# Patient Record
Sex: FEMALE | Race: WHITE
[De-identification: names, ages, dates, MRNs, and addresses within clinical notes are randomized per-mention and may not be internally consistent; named-entity substitution may affect disease eponyms.]

---

## 2018-12-26 ENCOUNTER — HOSPITAL ENCOUNTER (EMERGENCY)
Dept: HOSPITAL 82 - ED | Age: 48
Discharge: HOME | DRG: 392 | End: 2018-12-26
Payer: COMMERCIAL

## 2018-12-26 VITALS — BODY MASS INDEX: 33.94 KG/M2 | HEIGHT: 65 IN | WEIGHT: 203.71 LBS

## 2018-12-26 VITALS — SYSTOLIC BLOOD PRESSURE: 119 MMHG | DIASTOLIC BLOOD PRESSURE: 73 MMHG

## 2018-12-26 DIAGNOSIS — K29.00: Primary | ICD-10-CM

## 2018-12-26 DIAGNOSIS — Z87.442: ICD-10-CM

## 2018-12-26 DIAGNOSIS — E11.9: ICD-10-CM

## 2018-12-26 LAB
ALBUMIN SERPL-MCNC: 4 G/DL (ref 3.2–5)
ALP SERPL-CCNC: 74 U/L (ref 38–126)
AMYLASE SERPL-CCNC: 54 U/L (ref 30–110)
ANION GAP SERPL CALCULATED.3IONS-SCNC: 16 MMOL/L
AST SERPL-CCNC: 26 U/L (ref 14–36)
BASOPHILS NFR BLD AUTO: 0 % (ref 0–3)
BUN SERPL-MCNC: 10 MG/DL (ref 7–17)
BUN/CREAT SERPL: 24
CHLORIDE SERPL-SCNC: 97 MMOL/L (ref 95–108)
CO2 SERPL-SCNC: 28 MMOL/L (ref 22–30)
CREAT SERPL-MCNC: 0.4 MG/DL (ref 0.5–1)
EOSINOPHIL NFR BLD AUTO: 0 % (ref 0–8)
ERYTHROCYTE [DISTWIDTH] IN BLOOD BY AUTOMATED COUNT: 15.1 % (ref 11.5–15.5)
HCT VFR BLD AUTO: 36.7 % (ref 37–47)
HGB BLD-MCNC: 12.1 G/DL (ref 12–16)
IMM GRANULOCYTES NFR BLD: 0.4 % (ref 0–5)
LIPASE SERPL-CCNC: 35 U/L (ref 23–300)
LYMPHOCYTES NFR BLD: 14 % (ref 15–41)
MCH RBC QN AUTO: 29.1 PG  CALC (ref 26–32)
MCHC RBC AUTO-ENTMCNC: 33 G/L CALC (ref 32–36)
MCV RBC AUTO: 88.2 FL  CALC (ref 80–100)
MONOCYTES NFR BLD AUTO: 6 % (ref 2–13)
NEUTROPHILS # BLD AUTO: 12.12 THOU/UL (ref 2–7.15)
NEUTROPHILS NFR BLD AUTO: 79 % (ref 42–76)
PLATELET # BLD AUTO: 316 THOU/UL (ref 130–400)
POTASSIUM SERPL-SCNC: 3.6 MMOL/L (ref 3.5–5.1)
PROT SERPL-MCNC: 7.5 G/DL (ref 6.3–8.2)
RBC # BLD AUTO: 4.16 MILL/UL (ref 4.2–5.6)
SODIUM SERPL-SCNC: 137 MMOL/L (ref 137–146)

## 2019-03-19 ENCOUNTER — HOSPITAL ENCOUNTER (OUTPATIENT)
Dept: HOSPITAL 82 - PO | Age: 49
Discharge: HOME | DRG: 951 | End: 2019-03-19
Attending: SURGERY
Payer: COMMERCIAL

## 2019-03-19 VITALS — DIASTOLIC BLOOD PRESSURE: 71 MMHG | SYSTOLIC BLOOD PRESSURE: 95 MMHG

## 2019-03-19 DIAGNOSIS — Z01.818: Primary | ICD-10-CM

## 2019-03-19 DIAGNOSIS — N13.30: ICD-10-CM

## 2019-03-19 DIAGNOSIS — E11.9: ICD-10-CM

## 2019-03-19 DIAGNOSIS — R00.0: ICD-10-CM

## 2019-03-19 DIAGNOSIS — R11.2: ICD-10-CM

## 2019-03-19 DIAGNOSIS — K59.00: ICD-10-CM

## 2019-03-19 DIAGNOSIS — K21.9: ICD-10-CM

## 2019-03-19 DIAGNOSIS — R14.0: ICD-10-CM

## 2019-03-19 DIAGNOSIS — Z98.51: ICD-10-CM

## 2019-03-19 DIAGNOSIS — Z98.890: ICD-10-CM

## 2019-03-19 DIAGNOSIS — Z90.49: ICD-10-CM

## 2019-03-19 DIAGNOSIS — I10: ICD-10-CM

## 2019-03-27 ENCOUNTER — HOSPITAL ENCOUNTER (OUTPATIENT)
Dept: HOSPITAL 82 - ENDO | Age: 49
Discharge: HOME | DRG: 392 | End: 2019-03-27
Attending: SURGERY
Payer: COMMERCIAL

## 2019-03-27 VITALS — BODY MASS INDEX: 31.49 KG/M2 | HEIGHT: 65 IN | WEIGHT: 189 LBS

## 2019-03-27 VITALS — SYSTOLIC BLOOD PRESSURE: 121 MMHG | DIASTOLIC BLOOD PRESSURE: 91 MMHG

## 2019-03-27 DIAGNOSIS — K62.1: ICD-10-CM

## 2019-03-27 DIAGNOSIS — K29.70: Primary | ICD-10-CM

## 2019-03-27 DIAGNOSIS — K50.10: ICD-10-CM

## 2019-03-27 PROCEDURE — 0DBK8ZX EXCISION OF ASCENDING COLON, VIA NATURAL OR ARTIFICIAL OPENING ENDOSCOPIC, DIAGNOSTIC: ICD-10-PCS | Performed by: SURGERY

## 2019-03-27 PROCEDURE — 0DB78ZX EXCISION OF STOMACH, PYLORUS, VIA NATURAL OR ARTIFICIAL OPENING ENDOSCOPIC, DIAGNOSTIC: ICD-10-PCS | Performed by: SURGERY

## 2019-03-27 PROCEDURE — 0DBH8ZX EXCISION OF CECUM, VIA NATURAL OR ARTIFICIAL OPENING ENDOSCOPIC, DIAGNOSTIC: ICD-10-PCS | Performed by: SURGERY

## 2019-03-27 PROCEDURE — 0DBP8ZX EXCISION OF RECTUM, VIA NATURAL OR ARTIFICIAL OPENING ENDOSCOPIC, DIAGNOSTIC: ICD-10-PCS | Performed by: SURGERY

## 2019-04-07 ENCOUNTER — HOSPITAL ENCOUNTER (OUTPATIENT)
Dept: HOSPITAL 82 - ED | Age: 49
Setting detail: OBSERVATION
LOS: 2 days | Discharge: HOME | DRG: 392 | End: 2019-04-09
Attending: INTERNAL MEDICINE | Admitting: INTERNAL MEDICINE
Payer: COMMERCIAL

## 2019-04-07 VITALS — HEIGHT: 65 IN | BODY MASS INDEX: 31.04 KG/M2 | WEIGHT: 186.31 LBS

## 2019-04-07 DIAGNOSIS — Z90.49: ICD-10-CM

## 2019-04-07 DIAGNOSIS — M19.90: ICD-10-CM

## 2019-04-07 DIAGNOSIS — N13.2: ICD-10-CM

## 2019-04-07 DIAGNOSIS — K29.00: Primary | ICD-10-CM

## 2019-04-07 DIAGNOSIS — E11.9: ICD-10-CM

## 2019-04-07 DIAGNOSIS — I10: ICD-10-CM

## 2019-04-07 DIAGNOSIS — K29.50: ICD-10-CM

## 2019-04-07 DIAGNOSIS — Z79.899: ICD-10-CM

## 2019-04-07 LAB
ALBUMIN SERPL-MCNC: 4.4 G/DL (ref 3.2–5)
ALP SERPL-CCNC: 82 U/L (ref 38–126)
AMYLASE SERPL-CCNC: 49 U/L (ref 30–110)
ANION GAP SERPL CALCULATED.3IONS-SCNC: 18 MMOL/L
AST SERPL-CCNC: 20 U/L (ref 14–36)
BASOPHILS NFR BLD AUTO: 0 % (ref 0–3)
BILIRUB UR QL STRIP.AUTO: NEGATIVE
BUN SERPL-MCNC: 11 MG/DL (ref 7–17)
BUN/CREAT SERPL: 21
CHLORIDE SERPL-SCNC: 98 MMOL/L (ref 95–108)
CO2 SERPL-SCNC: 27 MMOL/L (ref 22–30)
COLOR UR AUTO: YELLOW
CREAT SERPL-MCNC: 0.5 MG/DL (ref 0.5–1)
EOSINOPHIL NFR BLD AUTO: 3 % (ref 0–8)
ERYTHROCYTE [DISTWIDTH] IN BLOOD BY AUTOMATED COUNT: 14.5 % (ref 11.5–15.5)
GLUCOSE UR STRIP.AUTO-MCNC: NEGATIVE MG/DL
HCT VFR BLD AUTO: 38.4 % (ref 37–47)
HGB BLD-MCNC: 12.7 G/DL (ref 12–16)
HGB UR QL STRIP.AUTO: (no result)
IMM GRANULOCYTES NFR BLD: 0.3 % (ref 0–5)
KETONES UR STRIP.AUTO-MCNC: NEGATIVE MG/DL
LEUKOCYTE ESTERASE UR QL STRIP.AUTO: NEGATIVE
LIPASE SERPL-CCNC: 63 U/L (ref 23–300)
LYMPHOCYTES NFR BLD: 19 % (ref 15–41)
MCH RBC QN AUTO: 29.5 PG  CALC (ref 26–32)
MCHC RBC AUTO-ENTMCNC: 33.1 G/L CALC (ref 32–36)
MCV RBC AUTO: 89.3 FL  CALC (ref 80–100)
MONOCYTES NFR BLD AUTO: 6 % (ref 2–13)
NEUTROPHILS # BLD AUTO: 8.5 THOU/UL (ref 2–7.15)
NEUTROPHILS NFR BLD AUTO: 71 % (ref 42–76)
NITRITE UR QL STRIP.AUTO: NEGATIVE
PH UR STRIP.AUTO: 5.5 [PH] (ref 4.5–8)
PLATELET # BLD AUTO: 362 THOU/UL (ref 130–400)
POTASSIUM SERPL-SCNC: 3.7 MMOL/L (ref 3.5–5.1)
PROT SERPL-MCNC: 7.5 G/DL (ref 6.3–8.2)
PROT UR QL STRIP.AUTO: NEGATIVE MG/DL
RBC # BLD AUTO: 4.3 MILL/UL (ref 4.2–5.6)
SODIUM SERPL-SCNC: 139 MMOL/L (ref 137–146)
SP GR UR STRIP.AUTO: 1.02
SQUAMOUS URNS QL MICRO: (no result) EPI/HPF
UROBILINOGEN UR QL STRIP.AUTO: 0.2 E.U./DL

## 2019-04-07 PROCEDURE — G0378 HOSPITAL OBSERVATION PER HR: HCPCS

## 2019-04-07 PROCEDURE — S0164 INJECTION PANTROPRAZOLE: HCPCS

## 2019-04-08 VITALS — DIASTOLIC BLOOD PRESSURE: 79 MMHG | SYSTOLIC BLOOD PRESSURE: 110 MMHG

## 2019-04-08 VITALS — DIASTOLIC BLOOD PRESSURE: 62 MMHG | SYSTOLIC BLOOD PRESSURE: 94 MMHG

## 2019-04-08 VITALS — SYSTOLIC BLOOD PRESSURE: 86 MMHG | DIASTOLIC BLOOD PRESSURE: 59 MMHG

## 2019-04-08 VITALS — SYSTOLIC BLOOD PRESSURE: 94 MMHG | DIASTOLIC BLOOD PRESSURE: 75 MMHG

## 2019-04-08 VITALS — DIASTOLIC BLOOD PRESSURE: 80 MMHG | SYSTOLIC BLOOD PRESSURE: 120 MMHG

## 2019-04-08 VITALS — SYSTOLIC BLOOD PRESSURE: 90 MMHG | DIASTOLIC BLOOD PRESSURE: 60 MMHG

## 2019-04-08 VITALS — SYSTOLIC BLOOD PRESSURE: 120 MMHG | DIASTOLIC BLOOD PRESSURE: 79 MMHG

## 2019-04-08 LAB
BASOPHILS NFR BLD AUTO: 0 % (ref 0–3)
EOSINOPHIL NFR BLD AUTO: 2 % (ref 0–8)
ERYTHROCYTE [DISTWIDTH] IN BLOOD BY AUTOMATED COUNT: 14.4 % (ref 11.5–15.5)
HCT VFR BLD AUTO: 34.6 % (ref 37–47)
HGB BLD-MCNC: 11.1 G/DL (ref 12–16)
IMM GRANULOCYTES NFR BLD: 0.5 % (ref 0–5)
LYMPHOCYTES NFR BLD: 21 % (ref 15–41)
MCH RBC QN AUTO: 29 PG  CALC (ref 26–32)
MCHC RBC AUTO-ENTMCNC: 32.1 G/L CALC (ref 32–36)
MCV RBC AUTO: 90.3 FL  CALC (ref 80–100)
MONOCYTES NFR BLD AUTO: 6 % (ref 2–13)
NEUTROPHILS # BLD AUTO: 7.65 THOU/UL (ref 2–7.15)
NEUTROPHILS NFR BLD AUTO: 70 % (ref 42–76)
PLATELET # BLD AUTO: 279 THOU/UL (ref 130–400)
RBC # BLD AUTO: 3.83 MILL/UL (ref 4.2–5.6)

## 2019-04-08 NOTE — NUR
NOTIFIED ER DOCTOR THAT PT. IS C/O SORE THROAT; ORDER RECEIVED TO OBTAIN STREP
SWAB; WILL CARRY OUT ORDER;

## 2019-04-08 NOTE — NUR
TRANSPORTED OFF UNIT VIA W/C BY VOLUNTEER FOR PROCEDURE AND RETURNED TO UNIT,
RESTING IN BED AT THIS TIME, ALL NEEDS ADDRESSED, FAMILY IN ROOM.

## 2019-04-08 NOTE — NUR
SHIFT CHANGE REPORT, PT SLEEPING BUT AROUSED TO VERVAL STIMULI, ORIENTED, C/O
PAIN @ 2/10 TO RIGHT SIDE, IVF INFUSING, CALL BELL IN REACH.

## 2019-04-08 NOTE — NUR
SECOND BOLUS STARTED AS PER ORDER; ORDERED TORADOL GIVEN FOR RIGHT SIDED ABD
PAIN 4/10; WILL REASSESS; CALL LIGHT IS IN REACH.

## 2019-04-08 NOTE — NUR
NOTIFIED DR. GONSALVES OF B/P NOW BEING 94/62 AND OF BEING TACHYCARDIC 
AND LOW GRADE TEMP NOW OF 99.1, BUT WITH IT PREVIOUSLY BEING 100; ALSO THAT
PT. IS C/O ABD PAIN; NEW ORDERS RECEIVED AND TO BE CARRIED OUT; WILL UPDATE
PT. WITH POC.

## 2019-04-08 NOTE — NUR
PT RESTING IN BED WATCHING TV. PT C/O PAIN 4/10 MEDICATED PER ORDER. PT IN A&O
x3. AMBULATES SELF TO RESTROOM. ASSESMENT COMPLETED AT THIS TIME. IV INFUSING
WELL. NO OTHER NEEDS AT THIS TIME. CALL BELL IN REACH. WILL CONTINUE TO
MONITOR.

## 2019-04-08 NOTE — NUR
0430-CALLED ED DR AND NOTIFIED HIM OF PT'S B/P BEING 86/59; NEW ORDERS
RECEIVED AND TO BE CARRIED OUT.
 
0435- NS 1,000 ML BOLUS HUNG AT THIS TIME FOR B/P 86/59; WILL REASSESS POST
BOLUS. PT. IS ASYMPTOMATIC.

## 2019-04-08 NOTE — NUR
PT. ARRIVED TO THE FLOOR VIA STRETCHER ACCOMPANIED BY ER NURSE @0109; PT.
AMBULATORY AND STEADY GAIT; PT. VOIDED 400MLS OF CLEAR YELLOW URINE AND
STRAINED AT THIS TIME; NO STONES NOTED; IV SITE PATENT AND ORDERED IVF HUNG;
PT. REPORTS PAIN DOWN TO 4/10; EDUCATED ON PAIN MEDICATION FREQUENCIES AND
VERBALIZES UNDERSTANDING; TEMP REASSESSED AND IS 99.8; WILL CONTINUE TO
MONITOR; EDUCATED ON POC, CALL LIGHT USE, AND ROOM; VERBALIZES UNDERSTANDING;
PT. DOES C/O SORE THROAT; NO REDNESS NOTED TO THROAT; INSTRUCTED PT. TO CALL
FOR ANY NEEDS; CALL LIGHT IS IN REACH; WILL CONTINUE TO MONITOR.

## 2019-04-08 NOTE — NUR
Admission Note
 
 
Report Given to:         NIHARIKA MICHELLE
Transported by:           Wheelchair          X Stretcher
 
Transported with:        X Nurse     Transporter   X Patent IV    O2
                          Cardiac Monitor

## 2019-04-08 NOTE — NUR
RECEIVED REPORT FROM DAY NURSE. PT RESTING IN BED WATCHING TV. PT C/O PAIN AT
THIS TIME. CALL BELL IN REACH. WILL CONTINUE TO MONITOR.

## 2019-04-09 VITALS — SYSTOLIC BLOOD PRESSURE: 108 MMHG | DIASTOLIC BLOOD PRESSURE: 72 MMHG

## 2019-04-09 VITALS — DIASTOLIC BLOOD PRESSURE: 80 MMHG | SYSTOLIC BLOOD PRESSURE: 112 MMHG

## 2019-04-09 VITALS — SYSTOLIC BLOOD PRESSURE: 104 MMHG | DIASTOLIC BLOOD PRESSURE: 73 MMHG

## 2019-04-09 LAB
ALBUMIN SERPL-MCNC: 3 G/DL (ref 3.2–5)
ALP SERPL-CCNC: 63 U/L (ref 38–126)
AMYLASE SERPL-CCNC: < 30 U/L (ref 30–110)
ANION GAP SERPL CALCULATED.3IONS-SCNC: 13 MMOL/L
AST SERPL-CCNC: 17 U/L (ref 14–36)
BASOPHILS NFR BLD AUTO: 0 % (ref 0–3)
BUN SERPL-MCNC: 4 MG/DL (ref 7–17)
BUN/CREAT SERPL: 9
CHLORIDE SERPL-SCNC: 107 MMOL/L (ref 95–108)
CO2 SERPL-SCNC: 26 MMOL/L (ref 22–30)
CREAT SERPL-MCNC: 0.5 MG/DL (ref 0.5–1)
EOSINOPHIL NFR BLD AUTO: 4 % (ref 0–8)
ERYTHROCYTE [DISTWIDTH] IN BLOOD BY AUTOMATED COUNT: 14.6 % (ref 11.5–15.5)
HCT VFR BLD AUTO: 31 % (ref 37–47)
HGB BLD-MCNC: 9.8 G/DL (ref 12–16)
IMM GRANULOCYTES NFR BLD: 0.3 % (ref 0–5)
LIPASE SERPL-CCNC: 30 U/L (ref 23–300)
LYMPHOCYTES NFR BLD: 29 % (ref 15–41)
MAGNESIUM SERPL-MCNC: 1.4 MG/DL (ref 1.6–2.3)
MCH RBC QN AUTO: 29 PG  CALC (ref 26–32)
MCHC RBC AUTO-ENTMCNC: 31.6 G/L CALC (ref 32–36)
MCV RBC AUTO: 91.7 FL  CALC (ref 80–100)
MONOCYTES NFR BLD AUTO: 7 % (ref 2–13)
NEUTROPHILS # BLD AUTO: 4.73 THOU/UL (ref 2–7.15)
NEUTROPHILS NFR BLD AUTO: 60 % (ref 42–76)
PLATELET # BLD AUTO: 231 THOU/UL (ref 130–400)
POTASSIUM SERPL-SCNC: 3.2 MMOL/L (ref 3.5–5.1)
PROT SERPL-MCNC: 5.5 G/DL (ref 6.3–8.2)
RBC # BLD AUTO: 3.38 MILL/UL (ref 4.2–5.6)
SODIUM SERPL-SCNC: 142 MMOL/L (ref 137–146)

## 2019-04-09 NOTE — NUR
PT RESTING IN BED. NO C/O PAIN OR NEEDS. IV FLUIDS INFUSING. CALL LIGHT IN
REACH. WILL CONTINUE TO MONITOR.

## 2019-04-09 NOTE — NUR
PT REPORT RECIEVED FROM CHARLES SCHWARTZ. PT SLEEPING. NO S/S OF DISTRESS. CALL
LIGHT IN REACH. WILL CONTINUE TO MONITOR.

## 2019-04-09 NOTE — NUR
D/C INSTRUCTIONS DISCUSSED W/ PT. PT STATES UNDERSTANDING. IV REMOVED.
CATHETER INTACT. PT GETTING DRESSED. SPOUSE AT BEDSIDE

## 2019-04-09 NOTE — NUR
PT A/O X3. SPEECH IS CLEAR. RESP EVEN AND UNLABORED. LUNG SOUNDS CLEAR.
PRODUCTIVE COUGH NOTED; YELLOWISH/GREY THICK SPUTUM. BOWEL SOUNDS ACTIVE X4.
PT C/O RT SIDED ABDOMINAL TENDERNESS. STRONG RADIAL AND PEDAL PULSES. #20 RAC
NS @125. SITE APPEARS HEALTHY. SKIN INTACT. PT DENIES ANY PAIN OR NEEDS. POC
DISCUSSED. SAFETY PRECAUTIONS IN PLACE. CALL LIGHT IN REACH. WILL CONTINUE TO
MONITOR.

## 2020-02-07 ENCOUNTER — HOSPITAL ENCOUNTER (EMERGENCY)
Age: 50
Discharge: HOME | DRG: 918 | End: 2020-02-07
Payer: COMMERCIAL

## 2020-02-07 DIAGNOSIS — R20.0: ICD-10-CM

## 2020-02-07 DIAGNOSIS — T44.5X1A: Primary | ICD-10-CM

## 2020-02-07 DIAGNOSIS — L98.8: ICD-10-CM

## 2020-02-07 DIAGNOSIS — Z79.84: ICD-10-CM

## 2020-02-07 DIAGNOSIS — E11.9: ICD-10-CM

## 2021-05-29 ENCOUNTER — HOSPITAL ENCOUNTER (EMERGENCY)
Dept: HOSPITAL 82 - ED | Age: 51
Discharge: HOME | DRG: 392 | End: 2021-05-29
Payer: COMMERCIAL

## 2021-05-29 VITALS — BODY MASS INDEX: 28.21 KG/M2 | HEIGHT: 65 IN | WEIGHT: 169.32 LBS

## 2021-05-29 VITALS — DIASTOLIC BLOOD PRESSURE: 71 MMHG | SYSTOLIC BLOOD PRESSURE: 133 MMHG

## 2021-05-29 DIAGNOSIS — Z79.84: ICD-10-CM

## 2021-05-29 DIAGNOSIS — Z87.442: ICD-10-CM

## 2021-05-29 DIAGNOSIS — K59.00: Primary | ICD-10-CM

## 2021-05-29 DIAGNOSIS — E11.9: ICD-10-CM

## 2021-05-29 LAB
ALBUMIN SERPL-MCNC: 4.2 G/DL (ref 3.2–5)
ALP SERPL-CCNC: 77 U/L (ref 38–126)
AMYLASE SERPL-CCNC: 69 U/L (ref 30–110)
ANION GAP SERPL CALCULATED.3IONS-SCNC: 15 MMOL/L
AST SERPL-CCNC: 25 U/L (ref 14–36)
BASOPHILS NFR BLD AUTO: 0 % (ref 0–3)
BILIRUB UR QL STRIP.AUTO: NEGATIVE
BUN SERPL-MCNC: 14 MG/DL (ref 7–17)
BUN/CREAT SERPL: 22
CHLORIDE SERPL-SCNC: 99 MMOL/L (ref 95–108)
CO2 SERPL-SCNC: 26 MMOL/L (ref 22–30)
COLOR UR AUTO: YELLOW
CREAT SERPL-MCNC: 0.7 MG/DL (ref 0.5–1)
EOSINOPHIL NFR BLD AUTO: 2 % (ref 0–8)
ERYTHROCYTE [DISTWIDTH] IN BLOOD BY AUTOMATED COUNT: 14.5 % (ref 11.5–15.5)
GLUCOSE UR STRIP.AUTO-MCNC: NEGATIVE MG/DL
HCT VFR BLD AUTO: 37.5 % (ref 37–47)
HGB BLD-MCNC: 11.9 G/DL (ref 12–16)
HGB UR QL STRIP.AUTO: NEGATIVE
IMM GRANULOCYTES NFR BLD: 0.3 % (ref 0–5)
KETONES UR STRIP.AUTO-MCNC: NEGATIVE MG/DL
LEUKOCYTE ESTERASE UR QL STRIP.AUTO: NEGATIVE
LIPASE SERPL-CCNC: 66 U/L (ref 23–300)
LYMPHOCYTES NFR BLD: 24 % (ref 15–41)
MCH RBC QN AUTO: 27.5 PG  CALC (ref 26–32)
MCHC RBC AUTO-ENTMCNC: 31.7 G/DL CAL (ref 32–36)
MCV RBC AUTO: 86.8 FL  CALC (ref 80–100)
MONOCYTES NFR BLD AUTO: 8 % (ref 2–13)
NEUTROPHILS # BLD AUTO: 5.89 THOU/UL (ref 2–7.15)
NEUTROPHILS NFR BLD AUTO: 66 % (ref 42–76)
NITRITE UR QL STRIP.AUTO: NEGATIVE
PH UR STRIP.AUTO: 7 [PH] (ref 4.5–8)
PLATELET # BLD AUTO: 309 THOU/UL (ref 130–400)
POTASSIUM SERPL-SCNC: 3.5 MMOL/L (ref 3.5–5.1)
PROT SERPL-MCNC: 7.4 G/DL (ref 6.3–8.2)
PROT UR QL STRIP.AUTO: NEGATIVE MG/DL
RBC # BLD AUTO: 4.32 MILL/UL (ref 4.2–5.6)
SODIUM SERPL-SCNC: 137 MMOL/L (ref 137–146)
SP GR UR STRIP.AUTO: 1.01
UROBILINOGEN UR QL STRIP.AUTO: 0.2 E.U./DL

## 2021-08-07 ENCOUNTER — HOSPITAL ENCOUNTER (EMERGENCY)
Dept: HOSPITAL 82 - ED | Age: 51
Discharge: HOME | DRG: 605 | End: 2021-08-07
Payer: COMMERCIAL

## 2021-08-07 VITALS — SYSTOLIC BLOOD PRESSURE: 112 MMHG | DIASTOLIC BLOOD PRESSURE: 70 MMHG

## 2021-08-07 DIAGNOSIS — E11.9: ICD-10-CM

## 2021-08-07 DIAGNOSIS — K13.79: ICD-10-CM
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
## 2021-08-07 DIAGNOSIS — S80.212A: Primary | ICD-10-CM

## 2021-08-07 DIAGNOSIS — R04.0: ICD-10-CM

## 2021-08-07 DIAGNOSIS — Z79.84: ICD-10-CM
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
## 2021-08-07 DIAGNOSIS — M25.572: ICD-10-CM

## 2021-08-11 ENCOUNTER — HOSPITAL ENCOUNTER (EMERGENCY)
Dept: HOSPITAL 82 - ED | Age: 51
Discharge: HOME | DRG: 951 | End: 2021-08-11
Payer: COMMERCIAL

## 2021-08-11 VITALS — SYSTOLIC BLOOD PRESSURE: 123 MMHG | DIASTOLIC BLOOD PRESSURE: 79 MMHG

## 2021-08-11 DIAGNOSIS — Z79.84: ICD-10-CM

## 2021-08-11 DIAGNOSIS — Z20.822: Primary | ICD-10-CM

## 2021-08-11 DIAGNOSIS — E11.9: ICD-10-CM

## 2023-10-02 ENCOUNTER — HOSPITAL ENCOUNTER (OUTPATIENT)
Dept: HOSPITAL 82 - ORM | Age: 53
Discharge: HOME | DRG: 395 | End: 2023-10-02
Attending: GENERAL ACUTE CARE HOSPITAL
Payer: COMMERCIAL

## 2023-10-02 VITALS — WEIGHT: 143 LBS | HEIGHT: 65 IN | BODY MASS INDEX: 23.82 KG/M2

## 2023-10-02 VITALS — SYSTOLIC BLOOD PRESSURE: 126 MMHG | DIASTOLIC BLOOD PRESSURE: 89 MMHG

## 2023-10-02 DIAGNOSIS — Z79.85: ICD-10-CM

## 2023-10-02 DIAGNOSIS — Z86.010: ICD-10-CM

## 2023-10-02 DIAGNOSIS — K64.8: ICD-10-CM

## 2023-10-02 DIAGNOSIS — I10: ICD-10-CM

## 2023-10-02 DIAGNOSIS — E11.9: ICD-10-CM

## 2023-10-02 DIAGNOSIS — D12.0: Primary | ICD-10-CM

## 2023-10-02 DIAGNOSIS — E78.5: ICD-10-CM

## 2023-10-02 DIAGNOSIS — K29.50: ICD-10-CM

## 2023-10-02 PROCEDURE — 0DBH8ZX EXCISION OF CECUM, VIA NATURAL OR ARTIFICIAL OPENING ENDOSCOPIC, DIAGNOSTIC: ICD-10-PCS | Performed by: GENERAL ACUTE CARE HOSPITAL

## 2023-10-02 PROCEDURE — 0DBB8ZX EXCISION OF ILEUM, VIA NATURAL OR ARTIFICIAL OPENING ENDOSCOPIC, DIAGNOSTIC: ICD-10-PCS | Performed by: GENERAL ACUTE CARE HOSPITAL

## 2023-10-02 PROCEDURE — 0DB78ZX EXCISION OF STOMACH, PYLORUS, VIA NATURAL OR ARTIFICIAL OPENING ENDOSCOPIC, DIAGNOSTIC: ICD-10-PCS | Performed by: GENERAL ACUTE CARE HOSPITAL

## 2023-10-02 PROCEDURE — 0DBE8ZX EXCISION OF LARGE INTESTINE, VIA NATURAL OR ARTIFICIAL OPENING ENDOSCOPIC, DIAGNOSTIC: ICD-10-PCS | Performed by: GENERAL ACUTE CARE HOSPITAL

## 2023-10-02 PROCEDURE — 0DB98ZX EXCISION OF DUODENUM, VIA NATURAL OR ARTIFICIAL OPENING ENDOSCOPIC, DIAGNOSTIC: ICD-10-PCS | Performed by: GENERAL ACUTE CARE HOSPITAL

## 2024-10-14 ENCOUNTER — HOSPITAL ENCOUNTER (OUTPATIENT)
Dept: HOSPITAL 82 - ENDO | Age: 54
Discharge: HOME | DRG: 951 | End: 2024-10-14
Attending: GENERAL ACUTE CARE HOSPITAL
Payer: COMMERCIAL

## 2024-10-14 VITALS — WEIGHT: 159 LBS | HEIGHT: 65 IN | BODY MASS INDEX: 26.49 KG/M2

## 2024-10-14 VITALS — DIASTOLIC BLOOD PRESSURE: 90 MMHG | SYSTOLIC BLOOD PRESSURE: 133 MMHG

## 2024-10-14 DIAGNOSIS — Z09: Primary | ICD-10-CM

## 2024-10-14 DIAGNOSIS — K58.1: ICD-10-CM

## 2024-10-14 DIAGNOSIS — K64.8: ICD-10-CM

## 2024-10-14 DIAGNOSIS — Z86.0101: ICD-10-CM

## 2024-10-14 DIAGNOSIS — E78.5: ICD-10-CM

## 2024-10-14 DIAGNOSIS — I10: ICD-10-CM

## 2024-10-14 PROCEDURE — 0DJD8ZZ INSPECTION OF LOWER INTESTINAL TRACT, VIA NATURAL OR ARTIFICIAL OPENING ENDOSCOPIC: ICD-10-PCS | Performed by: GENERAL ACUTE CARE HOSPITAL
